# Patient Record
Sex: FEMALE | Race: WHITE | Employment: STUDENT
[De-identification: names, ages, dates, MRNs, and addresses within clinical notes are randomized per-mention and may not be internally consistent; named-entity substitution may affect disease eponyms.]

---

## 2024-05-17 ENCOUNTER — APPOINTMENT (OUTPATIENT)
Facility: HOSPITAL | Age: 29
End: 2024-05-17

## 2024-05-17 ENCOUNTER — HOSPITAL ENCOUNTER (EMERGENCY)
Facility: HOSPITAL | Age: 29
Discharge: HOME | End: 2024-05-17
Attending: EMERGENCY MEDICINE

## 2024-05-17 VITALS
TEMPERATURE: 98.5 F | DIASTOLIC BLOOD PRESSURE: 66 MMHG | BODY MASS INDEX: 37.99 KG/M2 | HEART RATE: 81 BPM | WEIGHT: 228 LBS | HEIGHT: 65 IN | RESPIRATION RATE: 16 BRPM | SYSTOLIC BLOOD PRESSURE: 101 MMHG | OXYGEN SATURATION: 98 %

## 2024-05-17 DIAGNOSIS — R10.84 GENERALIZED ABDOMINAL PAIN: Primary | ICD-10-CM

## 2024-05-17 DIAGNOSIS — Z3A.27 27 WEEKS GESTATION OF PREGNANCY: ICD-10-CM

## 2024-05-17 DIAGNOSIS — S91.209A TOENAIL AVULSION, INITIAL ENCOUNTER: ICD-10-CM
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
## 2024-05-17 LAB
ANION GAP SERPL CALC-SCNC: 9 MMOL/L (ref 5–15)
BASOPHILS # BLD: 0 K/UL (ref 0–0.1)
BASOPHILS NFR BLD: 0 % (ref 0–1)
BUN SERPL-MCNC: 8 MG/DL (ref 6–20)
BUN/CREAT SERPL: 18 (ref 12–20)
CA-I BLD-MCNC: 8.7 MG/DL (ref 8.5–10.1)
CHLORIDE SERPL-SCNC: 106 MMOL/L (ref 97–108)
CO2 SERPL-SCNC: 22 MMOL/L (ref 21–32)
CREAT SERPL-MCNC: 0.44 MG/DL (ref 0.55–1.02)
DIFFERENTIAL METHOD BLD: ABNORMAL
EOSINOPHIL # BLD: 0.1 K/UL (ref 0–0.4)
EOSINOPHIL NFR BLD: 1 % (ref 0–7)
ERYTHROCYTE [DISTWIDTH] IN BLOOD BY AUTOMATED COUNT: 13.6 % (ref 11.5–14.5)
FIBRINOGEN PPP-MCNC: 536 MG/DL (ref 220–535)
GLUCOSE SERPL-MCNC: 75 MG/DL (ref 65–100)
HCT VFR BLD AUTO: 33.9 % (ref 35–47)
HGB BLD-MCNC: 11.4 G/DL (ref 11.5–16)
IMM GRANULOCYTES # BLD AUTO: 0 K/UL (ref 0–0.04)
IMM GRANULOCYTES NFR BLD AUTO: 0 % (ref 0–0.5)
LYMPHOCYTES # BLD: 2.2 K/UL (ref 0.8–3.5)
LYMPHOCYTES NFR BLD: 21 % (ref 12–49)
MCH RBC QN AUTO: 28.8 PG (ref 26–34)
MCHC RBC AUTO-ENTMCNC: 33.6 G/DL (ref 30–36.5)
MCV RBC AUTO: 85.6 FL (ref 80–99)
MONOCYTES # BLD: 1 K/UL (ref 0–1)
MONOCYTES NFR BLD: 10 % (ref 5–13)
NEUTS SEG # BLD: 7.3 K/UL (ref 1.8–8)
NEUTS SEG NFR BLD: 68 % (ref 32–75)
NRBC # BLD: 0 K/UL (ref 0–0.01)
NRBC BLD-RTO: 0 PER 100 WBC
PLATELET # BLD AUTO: 260 K/UL (ref 150–400)
PMV BLD AUTO: 9.8 FL (ref 8.9–12.9)
POTASSIUM SERPL-SCNC: 3.6 MMOL/L (ref 3.5–5.1)
RBC # BLD AUTO: 3.96 M/UL (ref 3.8–5.2)
SODIUM SERPL-SCNC: 137 MMOL/L (ref 136–145)
WBC # BLD AUTO: 10.6 K/UL (ref 3.6–11)

## 2024-05-17 PROCEDURE — 6830039000 HC L3 TRAUMA ALERT

## 2024-05-17 PROCEDURE — 80048 BASIC METABOLIC PNL TOTAL CA: CPT

## 2024-05-17 PROCEDURE — 85025 COMPLETE CBC W/AUTO DIFF WBC: CPT

## 2024-05-17 PROCEDURE — 6370000000 HC RX 637 (ALT 250 FOR IP): Performed by: EMERGENCY MEDICINE

## 2024-05-17 PROCEDURE — 76815 OB US LIMITED FETUS(S): CPT

## 2024-05-17 PROCEDURE — 85384 FIBRINOGEN ACTIVITY: CPT

## 2024-05-17 PROCEDURE — 36415 COLL VENOUS BLD VENIPUNCTURE: CPT

## 2024-05-17 PROCEDURE — 99285 EMERGENCY DEPT VISIT HI MDM: CPT

## 2024-05-17 RX ADMIN — Medication 3 ML: at 21:32

## 2024-05-17 ASSESSMENT — PAIN DESCRIPTION - LOCATION: LOCATION: TOE (COMMENT WHICH ONE)

## 2024-05-17 ASSESSMENT — ENCOUNTER SYMPTOMS
GASTROINTESTINAL NEGATIVE: 1
EYES NEGATIVE: 1
ALLERGIC/IMMUNOLOGIC NEGATIVE: 1
RESPIRATORY NEGATIVE: 1

## 2024-05-17 ASSESSMENT — PAIN DESCRIPTION - ORIENTATION: ORIENTATION: LEFT

## 2024-05-17 ASSESSMENT — PAIN - FUNCTIONAL ASSESSMENT: PAIN_FUNCTIONAL_ASSESSMENT: 0-10

## 2024-05-17 ASSESSMENT — PAIN SCALES - GENERAL
PAINLEVEL_OUTOF10: 7
PAINLEVEL_OUTOF10: 1

## 2024-05-17 NOTE — ED TRIAGE NOTES
Pt fell down two steps and landed on her side, pt is 25 weeks pregnant, high risk related to her water breaking at 16 weeks, A3.  Pt reports abdominal pain. Is not followed here.   Spoke with L&D and they deferred her to be tx in the ED and will come down to eval her

## 2024-05-18 NOTE — CONSULTS
History & Physical    Name: Ana Mancia MRN: [id number]  SSN: [id number]    YOB: 1995  Age: 28 y.o.  Sex: female      Subjective:     Reason for Admission:  Pregnancy and Maternal Trauma    History of Present Illness: Ana Mancia is a 28 y.o.  female with an estimated gestational age of 26w5d with Estimated Date of Delivery: 24. Patient complains of mild abdominal pain and tight leg/toe pain s/p Fall @ 1830 tonight on steps.  for 1 days. 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  Patient denies chest pain, contractions, fever, headache , nausea and vomiting, pelvic pressure, shortness of breath, swelling, vaginal bleeding , vaginal leaking of fluid , and visual disturbances.    OB History          1    Para        Term                AB        Living             SAB        IAB        Ectopic        Molar        Multiple        Live Births                  History reviewed. No pertinent past medical history.  History reviewed. No pertinent surgical history.  Social History     Occupational History    Not on file   Tobacco Use    Smoking status: Not on file    Smokeless tobacco: Not on file   Substance and Sexual Activity    Alcohol use: Not on file    Drug use: Not on file    Sexual activity: Not on file     History reviewed. No pertinent family history.    No Known Allergies  Prior to Admission medications    Not on File        Review of Systems   Constitutional: Negative.    HENT: Negative.     Eyes: Negative.    Respiratory: Negative.     Cardiovascular: Negative.    Gastrointestinal: Negative.    Endocrine: Negative.    Genitourinary: Negative.    Musculoskeletal:  Positive for arthralgias.   Skin: Negative.    Allergic/Immunologic: Negative.    Neurological: Negative.    Hematological: Negative.    Psychiatric/Behavioral: Negative.         Objective:     Vitals:    Vitals:

## 2024-05-18 NOTE — DISCHARGE INSTRUCTIONS
Thank you for choosing our Emergency Department for your care.  It is our privilege to care for you in your time of need.  In the next several days, you may receive a survey via email or mailed to your home about your experience with our team.  We would greatly appreciate you taking a few minutes to complete the survey, as we use this information to learn what we have done well and what we could be doing better. Thank you for trusting us with your care!    Below you will find a list of your tests from today's visit.   Labs  Recent Results (from the past 12 hour(s))   CBC with Auto Differential    Collection Time: 05/17/24  9:00 PM   Result Value Ref Range    WBC 10.6 3.6 - 11.0 K/uL    RBC 3.96 3.80 - 5.20 M/uL    Hemoglobin 11.4 (L) 11.5 - 16.0 g/dL    Hematocrit 33.9 (L) 35.0 - 47.0 %    MCV 85.6 80.0 - 99.0 FL    MCH 28.8 26.0 - 34.0 PG    MCHC 33.6 30.0 - 36.5 g/dL    RDW 13.6 11.5 - 14.5 %    Platelets 260 150 - 400 K/uL    MPV 9.8 8.9 - 12.9 FL    Nucleated RBCs 0.0 0.0  WBC    nRBC 0.00 0.00 - 0.01 K/uL    Neutrophils % 68 32 - 75 %    Lymphocytes % 21 12 - 49 %    Monocytes % 10 5 - 13 %    Eosinophils % 1 0 - 7 %    Basophils % 0 0 - 1 %    Immature Granulocytes % 0 0 - 0.5 %    Neutrophils Absolute 7.3 1.8 - 8.0 K/UL    Lymphocytes Absolute 2.2 0.8 - 3.5 K/UL    Monocytes Absolute 1.0 0.0 - 1.0 K/UL    Eosinophils Absolute 0.1 0.0 - 0.4 K/UL    Basophils Absolute 0.0 0.0 - 0.1 K/UL    Immature Granulocytes Absolute 0.0 0.00 - 0.04 K/UL    Differential Type AUTOMATED     Fibrinogen    Collection Time: 05/17/24  9:00 PM   Result Value Ref Range    Fibrinogen 536 (H) 220 - 535 mg/dL   Basic Metabolic Panel    Collection Time: 05/17/24  9:00 PM   Result Value Ref Range    Sodium 137 136 - 145 mmol/L    Potassium 3.6 3.5 - 5.1 mmol/L    Chloride 106 97 - 108 mmol/L    CO2 22 21 - 32 mmol/L    Anion Gap 9 5 - 15 mmol/L    Glucose 75 65 - 100 mg/dL    BUN 8 6 - 20 mg/dL    Creatinine 0.44 (L) 0.55 -

## 2024-05-18 NOTE — PROGRESS NOTES
1950: Patient placed on fetal monitoring. C/O side/back pain, no vaginal bleeding, no leakage of fluid.   2230: Dr. Johnson called regarding fetal monitoring. Okay to discontinue. Patient cleared from OB standpoint

## 2024-05-18 NOTE — ED PROVIDER NOTES
[facility] EMERGENCY DEPT  EMERGENCY DEPARTMENT HISTORY AND PHYSICAL EXAM      Date: 5/17/2024  Patient Name: Ana Mancia  MRN: [id number]  Birthdate 1995  Date of evaluation: 5/17/2024  Provider: Gerri Olivera MD   Note Started: 10:48 PM EDT 5/17/24    HISTORY OF PRESENT ILLNESS     Chief Complaint   Patient presents with    Back Pain       History Provided By: Patient    HPI: Ana Mancia is a 28 y.o. female no sig PMH who presents as a bravo TTA after slipping down 2 stairs and landing on her buttock.  Is currently 26 weeks pregnant and having some mild right sided abdominal pain and tailbone pain.  No vaginal bleeding, leakage of fluid.  Also ripped her right great toenail off in the fall.      PAST MEDICAL HISTORY   Past Medical History:  History reviewed. No pertinent past medical history.    Past Surgical History:  History reviewed. No pertinent surgical history.    Family History:  History reviewed. No pertinent family history.    Social History:       Allergies:  No Known Allergies    PCP: No primary care provider on file.    Current Meds:   No current facility-administered medications for this encounter.     No current outpatient medications on file.       Social Determinants of Health:   Social Determinants of Health     Tobacco Use: Not on file   Alcohol Use: Not on file   Financial Resource Strain: Not on file   Food Insecurity: Not on file   Transportation Needs: Not on file   Physical Activity: Not on file   Stress: Not on file   Social Connections: Not on file   Intimate Partner Violence: Not on file   Depression: Not on file   Housing Stability: Not on file   Postpartum Depression: Not on file   Utilities: Not on file   Interpersonal Safety: Not on file       PHYSICAL EXAM   Physical Exam  Vitals and nursing note reviewed.   Constitutional:       General: She is not in acute distress.     Appearance: Normal appearance. She is normal weight. She is not toxic-appearing.   HENT:      Head:  method: LET.  Nail Removal:     Stented with:  Foil  Post-procedure details:     Dressing: adhesive dressing.    Procedure completion:  Tolerated well, no immediate complications        CRITICAL CARE TIME   Patient does not meet Critical Care Time, 0 minutes    ED IMPRESSION     1. Generalized abdominal pain    2. Fall down stairs, initial encounter    3. 27 weeks gestation of pregnancy    4. Toenail avulsion, initial encounter          DISPOSITION/PLAN   DISPOSITION Decision To Discharge 05/17/2024 10:46:50 PM    Discharge Note: The patient is stable for discharge home. The signs, symptoms, diagnosis, and discharge instructions have been discussed, understanding conveyed, and agreed upon. The patient is to follow up as recommended or return to ER should their symptoms worsen.      PATIENT REFERRED TO:  [facility] EMERGENCY DEPT  [address] [facility] [address]  [phone]    As needed    [facility] MATERNAL FETAL MEDICINE - [facility] OFFICE  [address] [address]  Schedule an appointment as soon as possible for a visit       Kobi Archer DPM  [address] [address]  [phone]              DISCHARGE MEDICATIONS:     Medication List      You have not been prescribed any medications.           DISCONTINUED MEDICATIONS:  There are no discharge medications for this patient.      I am the Primary Clinician of Record. Gerri Olivera MD (electronically signed)    (Please note that parts of this dictation were completed with voice recognition software. Quite often unanticipated grammatical, syntax, homophones, and other interpretive errors are inadvertently transcribed by the computer software. Please disregards these errors. Please excuse any errors that have escaped final proofreading.)        Gerri Olivera MD  05/19/24 [id number]